# Patient Record
Sex: FEMALE | Race: WHITE | NOT HISPANIC OR LATINO | Employment: OTHER | URBAN - METROPOLITAN AREA
[De-identification: names, ages, dates, MRNs, and addresses within clinical notes are randomized per-mention and may not be internally consistent; named-entity substitution may affect disease eponyms.]

---

## 2019-11-22 ENCOUNTER — NEW PATIENT COMPREHENSIVE (OUTPATIENT)
Dept: URBAN - METROPOLITAN AREA CLINIC 36 | Facility: CLINIC | Age: 59
End: 2019-11-22

## 2019-11-22 DIAGNOSIS — H52.7: ICD-10-CM

## 2019-11-22 DIAGNOSIS — H25.811: ICD-10-CM

## 2019-11-22 DIAGNOSIS — H25.812: ICD-10-CM

## 2019-11-22 PROCEDURE — 92004 COMPRE OPH EXAM NEW PT 1/>: CPT

## 2019-11-22 PROCEDURE — 92015 DETERMINE REFRACTIVE STATE: CPT

## 2019-11-22 ASSESSMENT — VISUAL ACUITY
OS_CC: J5
OD_CC: J6
OS_SC: 20/25-1
OD_SC: <J10
OD_SC: 20/20-2
OS_SC: <J10

## 2020-01-30 NOTE — PATIENT DISCUSSION
Pt leaves for [place] March 9th. If no availabilty on surgery schedule can see Dr. Branden Golden for cataract surgery sooner.

## 2020-02-24 NOTE — PATIENT DISCUSSION
Cataract surgery has been performed in the first eye and activities of daily living are still impaired. The patient would like to proceed with cataract surgery in the second eye as scheduled. The patient elects Basi IOL OS, goal of emmetropia.

## 2022-03-22 ENCOUNTER — APPOINTMENT (RX ONLY)
Dept: URBAN - METROPOLITAN AREA CLINIC 132 | Facility: CLINIC | Age: 62
Setting detail: DERMATOLOGY
End: 2022-03-22

## 2022-03-22 DIAGNOSIS — L57.8 OTHER SKIN CHANGES DUE TO CHRONIC EXPOSURE TO NONIONIZING RADIATION: ICD-10-CM

## 2022-03-22 DIAGNOSIS — L82.1 OTHER SEBORRHEIC KERATOSIS: ICD-10-CM | Status: STABLE

## 2022-03-22 DIAGNOSIS — Z41.9 ENCOUNTER FOR PROCEDURE FOR PURPOSES OTHER THAN REMEDYING HEALTH STATE, UNSPECIFIED: ICD-10-CM

## 2022-03-22 DIAGNOSIS — D18.0 HEMANGIOMA: ICD-10-CM | Status: STABLE

## 2022-03-22 DIAGNOSIS — D22 MELANOCYTIC NEVI: ICD-10-CM | Status: STABLE

## 2022-03-22 DIAGNOSIS — L57.0 ACTINIC KERATOSIS: ICD-10-CM

## 2022-03-22 DIAGNOSIS — L81.4 OTHER MELANIN HYPERPIGMENTATION: ICD-10-CM | Status: STABLE

## 2022-03-22 PROBLEM — D18.01 HEMANGIOMA OF SKIN AND SUBCUTANEOUS TISSUE: Status: ACTIVE | Noted: 2022-03-22

## 2022-03-22 PROBLEM — D22.9 MELANOCYTIC NEVI, UNSPECIFIED: Status: ACTIVE | Noted: 2022-03-22

## 2022-03-22 PROCEDURE — ? LIQUID NITROGEN

## 2022-03-22 PROCEDURE — ? SUNSCREEN RECOMMENDATIONS

## 2022-03-22 PROCEDURE — ? COUNSELING

## 2022-03-22 PROCEDURE — 17000 DESTRUCT PREMALG LESION: CPT

## 2022-03-22 PROCEDURE — ? PRESCRIPTION MEDICATION MANAGEMENT

## 2022-03-22 PROCEDURE — 99203 OFFICE O/P NEW LOW 30 MIN: CPT | Mod: 25

## 2022-03-22 ASSESSMENT — LOCATION SIMPLE DESCRIPTION DERM: LOCATION SIMPLE: RIGHT CHEEK

## 2022-03-22 ASSESSMENT — LOCATION DETAILED DESCRIPTION DERM: LOCATION DETAILED: RIGHT SUPERIOR CENTRAL MALAR CHEEK

## 2022-03-22 ASSESSMENT — LOCATION ZONE DERM: LOCATION ZONE: FACE

## 2022-03-22 NOTE — PROCEDURE: LIQUID NITROGEN
Duration Of Freeze Thaw-Cycle (Seconds): 0
Show Applicator Variable?: Yes
Consent: The patient's verbal consent was obtained including but not limited to risks of crusting, scabbing, blistering, scarring, darker or lighter pigmentary change, recurrence, incomplete removal and infection.
Detail Level: Detailed
Render Note In Bullet Format When Appropriate: No
Post-Care Instructions: I reviewed with the patient in detail post-care instructions. Patient is to wear sunprotection, and avoid picking at any of the treated lesions.  Pt may apply Vaseline to crusted or scabbing areas and cover as needed

## 2022-03-22 NOTE — PROCEDURE: PRESCRIPTION MEDICATION MANAGEMENT
Render In Strict Bullet Format?: No
Detail Level: Zone
Plan: Recommend Skinceuticals vitamin C, tretinoin, cetaphil cleansers and moisturizer

## 2023-03-21 ENCOUNTER — APPOINTMENT (RX ONLY)
Dept: URBAN - METROPOLITAN AREA CLINIC 132 | Facility: CLINIC | Age: 63
Setting detail: DERMATOLOGY
End: 2023-03-21

## 2023-03-21 DIAGNOSIS — L57.8 OTHER SKIN CHANGES DUE TO CHRONIC EXPOSURE TO NONIONIZING RADIATION: ICD-10-CM

## 2023-03-21 DIAGNOSIS — L81.4 OTHER MELANIN HYPERPIGMENTATION: ICD-10-CM | Status: STABLE

## 2023-03-21 DIAGNOSIS — L82.1 OTHER SEBORRHEIC KERATOSIS: ICD-10-CM | Status: STABLE

## 2023-03-21 DIAGNOSIS — D22 MELANOCYTIC NEVI: ICD-10-CM | Status: STABLE

## 2023-03-21 DIAGNOSIS — L82.0 INFLAMED SEBORRHEIC KERATOSIS: ICD-10-CM

## 2023-03-21 DIAGNOSIS — Z87.2 PERSONAL HISTORY OF DISEASES OF THE SKIN AND SUBCUTANEOUS TISSUE: ICD-10-CM

## 2023-03-21 DIAGNOSIS — D18.0 HEMANGIOMA: ICD-10-CM | Status: STABLE

## 2023-03-21 PROBLEM — D18.01 HEMANGIOMA OF SKIN AND SUBCUTANEOUS TISSUE: Status: ACTIVE | Noted: 2023-03-21

## 2023-03-21 PROBLEM — D22.9 MELANOCYTIC NEVI, UNSPECIFIED: Status: ACTIVE | Noted: 2023-03-21

## 2023-03-21 PROCEDURE — 99213 OFFICE O/P EST LOW 20 MIN: CPT

## 2023-03-21 PROCEDURE — ? COUNSELING

## 2023-03-21 PROCEDURE — ? SUNSCREEN RECOMMENDATIONS

## 2023-03-21 ASSESSMENT — LOCATION ZONE DERM: LOCATION ZONE: LEG

## 2023-03-21 ASSESSMENT — LOCATION DETAILED DESCRIPTION DERM
LOCATION DETAILED: LEFT ANTERIOR PROXIMAL THIGH
LOCATION DETAILED: RIGHT LATERAL POPLITEAL SKIN

## 2023-03-21 ASSESSMENT — LOCATION SIMPLE DESCRIPTION DERM
LOCATION SIMPLE: LEFT THIGH
LOCATION SIMPLE: RIGHT POPLITEAL SKIN

## 2024-05-13 ENCOUNTER — NEW PATIENT (OUTPATIENT)
Dept: URBAN - METROPOLITAN AREA CLINIC 36 | Facility: CLINIC | Age: 64
End: 2024-05-13

## 2024-05-13 DIAGNOSIS — H52.7: ICD-10-CM

## 2024-05-13 PROCEDURE — 92004 COMPRE OPH EXAM NEW PT 1/>: CPT

## 2024-05-13 PROCEDURE — 92015 DETERMINE REFRACTIVE STATE: CPT

## 2024-05-13 ASSESSMENT — TONOMETRY
OD_IOP_MMHG: 17
OS_IOP_MMHG: 16

## 2024-05-13 ASSESSMENT — VISUAL ACUITY
OD_SC: 20/40
OS_SC: 20/40
OD_CC: J12
OS_CC: J12

## 2025-08-13 ENCOUNTER — APPOINTMENT (OUTPATIENT)
Dept: URBAN - METROPOLITAN AREA CLINIC 132 | Facility: CLINIC | Age: 65
Setting detail: DERMATOLOGY
End: 2025-08-13

## 2025-08-13 DIAGNOSIS — D22 MELANOCYTIC NEVI: ICD-10-CM | Status: STABLE

## 2025-08-13 DIAGNOSIS — D18.0 HEMANGIOMA: ICD-10-CM | Status: STABLE

## 2025-08-13 DIAGNOSIS — L57.8 OTHER SKIN CHANGES DUE TO CHRONIC EXPOSURE TO NONIONIZING RADIATION: ICD-10-CM

## 2025-08-13 DIAGNOSIS — L82.1 OTHER SEBORRHEIC KERATOSIS: ICD-10-CM | Status: STABLE

## 2025-08-13 DIAGNOSIS — Z87.2 PERSONAL HISTORY OF DISEASES OF THE SKIN AND SUBCUTANEOUS TISSUE: ICD-10-CM

## 2025-08-13 DIAGNOSIS — L81.4 OTHER MELANIN HYPERPIGMENTATION: ICD-10-CM | Status: STABLE

## 2025-08-13 PROBLEM — D22.9 MELANOCYTIC NEVI, UNSPECIFIED: Status: ACTIVE | Noted: 2025-08-13

## 2025-08-13 PROBLEM — D18.01 HEMANGIOMA OF SKIN AND SUBCUTANEOUS TISSUE: Status: ACTIVE | Noted: 2025-08-13

## 2025-08-13 PROCEDURE — ? COUNSELING

## 2025-08-13 PROCEDURE — ? SUNSCREEN RECOMMENDATIONS

## 2025-08-13 PROCEDURE — ? PRESCRIPTION

## 2025-08-13 PROCEDURE — ? MEDICATION COUNSELING

## 2025-08-13 PROCEDURE — ? PRESCRIPTION MEDICATION MANAGEMENT

## 2025-08-13 PROCEDURE — ?

## 2025-08-13 RX ORDER — FLUOROURACIL 5 MG/G
THIN LAYER CREAM TOPICAL BID
Qty: 40 | Refills: 1 | Status: ERX | COMMUNITY
Start: 2025-08-13

## 2025-08-13 RX ADMIN — FLUOROURACIL THIN LAYER: 5 CREAM TOPICAL at 00:00

## 2025-08-13 ASSESSMENT — LOCATION SIMPLE DESCRIPTION DERM: LOCATION SIMPLE: NOSE

## 2025-08-13 ASSESSMENT — LOCATION ZONE DERM: LOCATION ZONE: NOSE

## 2025-08-13 ASSESSMENT — LOCATION DETAILED DESCRIPTION DERM: LOCATION DETAILED: NASAL TIP
